# Patient Record
Sex: MALE | Race: WHITE | URBAN - METROPOLITAN AREA
[De-identification: names, ages, dates, MRNs, and addresses within clinical notes are randomized per-mention and may not be internally consistent; named-entity substitution may affect disease eponyms.]

---

## 2018-09-07 ENCOUNTER — OFFICE (OUTPATIENT)
Dept: URBAN - METROPOLITAN AREA CLINIC 78 | Facility: CLINIC | Age: 23
End: 2018-09-07

## 2018-09-07 VITALS
DIASTOLIC BLOOD PRESSURE: 89 MMHG | WEIGHT: 170 LBS | HEART RATE: 71 BPM | TEMPERATURE: 98.1 F | HEIGHT: 70 IN | SYSTOLIC BLOOD PRESSURE: 137 MMHG

## 2018-09-07 DIAGNOSIS — R10.9 UNSPECIFIED ABDOMINAL PAIN: ICD-10-CM

## 2018-09-07 DIAGNOSIS — R93.5 ABNORMAL FINDINGS ON DIAGNOSTIC IMAGING OF OTHER ABDOMINAL R: ICD-10-CM

## 2018-09-07 PROCEDURE — 00031: CPT

## 2018-09-07 PROCEDURE — 99244 OFF/OP CNSLTJ NEW/EST MOD 40: CPT

## 2018-09-07 NOTE — SERVICEHPINOTES
JAYCEE MICHELLE   is a   23   year old male who is being seen in consultation at the request of   [employer]   for abdominal pain which was periumbilical/right sided. He presented to the ER on 8/29/18 to discuss abdominal pain which began one day prior. Other associated symptoms included nausea, vomiting. In the ER he had a normal CBC and CMP. A CT scan of the ab/pelvis with IV contrast showed thickening in the wall of the ascending colon as well as mild thickening of the hepatic flexure and proximal transverse colon c/w colitis. Now, pain is a 2/10--feels tight and "sore" in the right mid abdomen. He was given Cipro and Flagyl for one week and he finished it on Wednesday. No prior issues like this in the past. No regular NSAID use. No family hx of GI issues. He denies chest pain, palpitations, weight loss, rectal bleeding, constipation, or diarrhea. No other GI related complaints today.

## 2018-10-04 ENCOUNTER — OFFICE (OUTPATIENT)
Dept: URBAN - METROPOLITAN AREA CLINIC 32 | Facility: CLINIC | Age: 23
End: 2018-10-04

## 2018-10-04 VITALS
RESPIRATION RATE: 17 BRPM | TEMPERATURE: 97.4 F | DIASTOLIC BLOOD PRESSURE: 92 MMHG | OXYGEN SATURATION: 100 % | HEART RATE: 73 BPM | DIASTOLIC BLOOD PRESSURE: 81 MMHG | SYSTOLIC BLOOD PRESSURE: 123 MMHG | RESPIRATION RATE: 21 BRPM | SYSTOLIC BLOOD PRESSURE: 147 MMHG | HEART RATE: 92 BPM | OXYGEN SATURATION: 99 % | TEMPERATURE: 98.1 F | RESPIRATION RATE: 14 BRPM | WEIGHT: 170 LBS | HEART RATE: 71 BPM | DIASTOLIC BLOOD PRESSURE: 85 MMHG | DIASTOLIC BLOOD PRESSURE: 69 MMHG | SYSTOLIC BLOOD PRESSURE: 141 MMHG | SYSTOLIC BLOOD PRESSURE: 129 MMHG | OXYGEN SATURATION: 97 % | RESPIRATION RATE: 22 BRPM | HEART RATE: 76 BPM | SYSTOLIC BLOOD PRESSURE: 133 MMHG | RESPIRATION RATE: 16 BRPM | DIASTOLIC BLOOD PRESSURE: 77 MMHG | HEART RATE: 72 BPM | HEIGHT: 70 IN | SYSTOLIC BLOOD PRESSURE: 130 MMHG | RESPIRATION RATE: 18 BRPM | HEART RATE: 78 BPM | DIASTOLIC BLOOD PRESSURE: 88 MMHG

## 2018-10-04 DIAGNOSIS — R93.5 ABNORMAL FINDINGS ON DIAGNOSTIC IMAGING OF OTHER ABDOMINAL R: ICD-10-CM

## 2018-10-04 DIAGNOSIS — R10.31 RIGHT LOWER QUADRANT PAIN: ICD-10-CM

## 2018-10-04 LAB
GI LOWER HISOLOGY - SPECM 1 JAR(S): 1: (no result)
PDF REPORT: (no result)

## 2018-10-04 PROCEDURE — 45380 COLONOSCOPY AND BIOPSY: CPT

## 2023-04-27 ENCOUNTER — OFFICE (OUTPATIENT)
Dept: URBAN - METROPOLITAN AREA CLINIC 34 | Facility: CLINIC | Age: 28
End: 2023-04-27

## 2023-04-27 VITALS
HEIGHT: 70 IN | TEMPERATURE: 98.2 F | SYSTOLIC BLOOD PRESSURE: 141 MMHG | DIASTOLIC BLOOD PRESSURE: 86 MMHG | WEIGHT: 206 LBS | HEART RATE: 70 BPM

## 2023-04-27 DIAGNOSIS — R10.13 EPIGASTRIC PAIN: ICD-10-CM

## 2023-04-27 DIAGNOSIS — R11.10 VOMITING, UNSPECIFIED: ICD-10-CM

## 2023-04-27 DIAGNOSIS — K92.0 HEMATEMESIS: ICD-10-CM

## 2023-04-27 DIAGNOSIS — R63.4 ABNORMAL WEIGHT LOSS: ICD-10-CM

## 2023-04-27 PROCEDURE — 99204 OFFICE O/P NEW MOD 45 MIN: CPT | Performed by: NURSE PRACTITIONER

## 2023-04-27 NOTE — SERVICEHPINOTES
Mr. Rodriguez is a 27 year old male with no significant PMH who is here in the clinic today to discuss vomiting. 
br His symptoms started 02/2023 when he began vomiting every AM.  Emesis usually consisted of food eaten the night before.  He has intermittent associated epigastric pain and small blood cots in his emesis.  He was seen by his PCP who diagnosed him with GERD and placed him on Protonix daily and gave him a modified GERD diet to follow. This did not really help with his symptoms  He has also cut out ETOH.  He denies excessive NSAID use. He has lost ~30lbs since changing his diet. He denies dysphagia, odynophagia, melena.  His BM's are QOD, BSS type 3, no BRB. 
br No prior EGD 
br Colonoscopy 10/2018, normal colonoscopy and biopsies.  Colonoscopy was done for abnormal imaging of the colon/acute colitis.  br
br

## 2023-05-01 LAB
AMBIG ABBREV BMP8 DEFAULT: (no result)
AMBIG ABBREV HFP7 DEFAULT: (no result)
BASIC METABOLIC PANEL (8): BUN/CREATININE RATIO: 8 — LOW (ref 9–20)
BASIC METABOLIC PANEL (8): BUN: 8 MG/DL (ref 6–20)
BASIC METABOLIC PANEL (8): CALCIUM: 10.2 MG/DL (ref 8.7–10.2)
BASIC METABOLIC PANEL (8): CARBON DIOXIDE, TOTAL: 24 MMOL/L (ref 20–29)
BASIC METABOLIC PANEL (8): CHLORIDE: 103 MMOL/L (ref 96–106)
BASIC METABOLIC PANEL (8): CREATININE: 1.01 MG/DL (ref 0.76–1.27)
BASIC METABOLIC PANEL (8): EGFR: 105 ML/MIN/1.73 (ref 59–?)
BASIC METABOLIC PANEL (8): GLUCOSE: 97 MG/DL (ref 70–99)
BASIC METABOLIC PANEL (8): POTASSIUM: 4.7 MMOL/L (ref 3.5–5.2)
BASIC METABOLIC PANEL (8): SODIUM: 142 MMOL/L (ref 134–144)
CBC/DIFF AMBIGUOUS DEFAULT: BASO (ABSOLUTE): 0 X10E3/UL (ref 0–0.2)
CBC/DIFF AMBIGUOUS DEFAULT: BASOS: 1 %
CBC/DIFF AMBIGUOUS DEFAULT: EOS (ABSOLUTE): 0.1 X10E3/UL (ref 0–0.4)
CBC/DIFF AMBIGUOUS DEFAULT: EOS: 2 %
CBC/DIFF AMBIGUOUS DEFAULT: HEMATOCRIT: 46.9 % (ref 37.5–51)
CBC/DIFF AMBIGUOUS DEFAULT: HEMATOLOGY COMMENTS: (no result)
CBC/DIFF AMBIGUOUS DEFAULT: HEMOGLOBIN: 15.7 G/DL (ref 13–17.7)
CBC/DIFF AMBIGUOUS DEFAULT: IMMATURE CELLS: (no result)
CBC/DIFF AMBIGUOUS DEFAULT: IMMATURE GRANS (ABS): 0 X10E3/UL (ref 0–0.1)
CBC/DIFF AMBIGUOUS DEFAULT: IMMATURE GRANULOCYTES: 0 %
CBC/DIFF AMBIGUOUS DEFAULT: LYMPHS (ABSOLUTE): 1.8 X10E3/UL (ref 0.7–3.1)
CBC/DIFF AMBIGUOUS DEFAULT: LYMPHS: 41 %
CBC/DIFF AMBIGUOUS DEFAULT: MCH: 27.4 PG (ref 26.6–33)
CBC/DIFF AMBIGUOUS DEFAULT: MCHC: 33.5 G/DL (ref 31.5–35.7)
CBC/DIFF AMBIGUOUS DEFAULT: MCV: 82 FL (ref 79–97)
CBC/DIFF AMBIGUOUS DEFAULT: MONOCYTES(ABSOLUTE): 0.4 X10E3/UL (ref 0.1–0.9)
CBC/DIFF AMBIGUOUS DEFAULT: MONOCYTES: 9 %
CBC/DIFF AMBIGUOUS DEFAULT: NEUTROPHILS (ABSOLUTE): 2.1 X10E3/UL (ref 1.4–7)
CBC/DIFF AMBIGUOUS DEFAULT: NEUTROPHILS: 47 %
CBC/DIFF AMBIGUOUS DEFAULT: NRBC: (no result)
CBC/DIFF AMBIGUOUS DEFAULT: PLATELETS: 227 X10E3/UL (ref 150–450)
CBC/DIFF AMBIGUOUS DEFAULT: RBC: 5.72 X10E6/UL (ref 4.14–5.8)
CBC/DIFF AMBIGUOUS DEFAULT: RDW: 13.9 % (ref 11.6–15.4)
CBC/DIFF AMBIGUOUS DEFAULT: WBC: 4.4 X10E3/UL (ref 3.4–10.8)
HEPATIC FUNCTION PANEL (7): ALBUMIN: 5 G/DL (ref 4.1–5.2)
HEPATIC FUNCTION PANEL (7): ALKALINE PHOSPHATASE: 60 IU/L (ref 44–121)
HEPATIC FUNCTION PANEL (7): ALT (SGPT): 15 IU/L (ref 0–44)
HEPATIC FUNCTION PANEL (7): AST (SGOT): 14 IU/L (ref 0–40)
HEPATIC FUNCTION PANEL (7): BILIRUBIN, DIRECT: <0.1 MG/DL
HEPATIC FUNCTION PANEL (7): BILIRUBIN, TOTAL: 0.3 MG/DL (ref 0–1.2)
HEPATIC FUNCTION PANEL (7): PROTEIN, TOTAL: 7.6 G/DL (ref 6–8.5)
LIPASE: 22 U/L (ref 13–78)

## 2023-05-10 ENCOUNTER — OFFICE (OUTPATIENT)
Dept: URBAN - METROPOLITAN AREA PATHOLOGY 18 | Facility: PATHOLOGY | Age: 28
End: 2023-05-10

## 2023-05-10 ENCOUNTER — OFFICE (OUTPATIENT)
Dept: URBAN - METROPOLITAN AREA CLINIC 98 | Facility: CLINIC | Age: 28
End: 2023-05-10

## 2023-05-10 VITALS
DIASTOLIC BLOOD PRESSURE: 78 MMHG | HEART RATE: 66 BPM | HEART RATE: 78 BPM | SYSTOLIC BLOOD PRESSURE: 123 MMHG | SYSTOLIC BLOOD PRESSURE: 111 MMHG | OXYGEN SATURATION: 97 % | SYSTOLIC BLOOD PRESSURE: 131 MMHG | DIASTOLIC BLOOD PRESSURE: 83 MMHG | HEIGHT: 70 IN | SYSTOLIC BLOOD PRESSURE: 115 MMHG | RESPIRATION RATE: 18 BRPM | TEMPERATURE: 97.9 F | RESPIRATION RATE: 16 BRPM | HEART RATE: 74 BPM | RESPIRATION RATE: 15 BRPM | OXYGEN SATURATION: 98 % | HEART RATE: 64 BPM | OXYGEN SATURATION: 95 % | HEART RATE: 63 BPM | DIASTOLIC BLOOD PRESSURE: 80 MMHG | OXYGEN SATURATION: 100 % | WEIGHT: 206 LBS | DIASTOLIC BLOOD PRESSURE: 87 MMHG | DIASTOLIC BLOOD PRESSURE: 67 MMHG | HEART RATE: 75 BPM | SYSTOLIC BLOOD PRESSURE: 125 MMHG | SYSTOLIC BLOOD PRESSURE: 114 MMHG | TEMPERATURE: 97.7 F | DIASTOLIC BLOOD PRESSURE: 65 MMHG

## 2023-05-10 DIAGNOSIS — K92.0 HEMATEMESIS: ICD-10-CM

## 2023-05-10 DIAGNOSIS — R10.13 EPIGASTRIC PAIN: ICD-10-CM

## 2023-05-10 DIAGNOSIS — R11.10 VOMITING, UNSPECIFIED: ICD-10-CM

## 2023-05-10 PROCEDURE — 88305 TISSUE EXAM BY PATHOLOGIST: CPT | Performed by: PATHOLOGY

## 2023-05-10 PROCEDURE — 88342 IMHCHEM/IMCYTCHM 1ST ANTB: CPT | Performed by: PATHOLOGY

## 2023-05-10 PROCEDURE — 88313 SPECIAL STAINS GROUP 2: CPT | Performed by: PATHOLOGY

## 2023-06-14 ENCOUNTER — OFFICE (OUTPATIENT)
Dept: URBAN - METROPOLITAN AREA CLINIC 79 | Facility: CLINIC | Age: 28
End: 2023-06-14

## 2023-06-14 VITALS
SYSTOLIC BLOOD PRESSURE: 134 MMHG | WEIGHT: 198 LBS | HEART RATE: 63 BPM | DIASTOLIC BLOOD PRESSURE: 79 MMHG | HEIGHT: 70 IN | TEMPERATURE: 98.8 F

## 2023-06-14 DIAGNOSIS — R10.11 RIGHT UPPER QUADRANT PAIN: ICD-10-CM

## 2023-06-14 PROCEDURE — 99213 OFFICE O/P EST LOW 20 MIN: CPT | Performed by: NURSE PRACTITIONER

## 2023-06-14 NOTE — SERVICEHPINOTES
Mr. Rodriguez is a 27 year old man with no past medical history who is here in clinic today for follow up for abdominal pain, vomiting and nausea. amritaHis symptoms started 02/2023 when he began vomiting every morning. His emesis consisted of food he ate the night before. He had associated intermittent epigastric abdominal pain with small clots of blood in his emesis. He was placed on Protonix daily by his PCP for Gerd and he also modified his diet and cut out alcohol. These changes did not really help his symptoms. He originally lost ~30 pounds when he changed his diet and an additional 10 since his last visit in April. brSince his last visit in April, he had lab work including a CBC, BMP, LFTs and a Lipase which were normal. He underwent an EGD, which was normal with benign biopsies. Had a RUQ ultrasound which showed no gallstones, normal bile ducts and no evidence of cholecystitis.Maria Elena continues to have RUQ pain. Overall, his nausea has improved and he has had no more vomiting. He always has some mild RUQ pain. When he eats fatty foods like Walker’s the RUQ pain gets worse. When he eats a healthier diet, the pain is minimal.